# Patient Record
Sex: MALE | Race: ASIAN | NOT HISPANIC OR LATINO | Employment: FULL TIME | URBAN - METROPOLITAN AREA
[De-identification: names, ages, dates, MRNs, and addresses within clinical notes are randomized per-mention and may not be internally consistent; named-entity substitution may affect disease eponyms.]

---

## 2019-05-16 ENCOUNTER — OFFICE VISIT - HEALTHEAST (OUTPATIENT)
Dept: FAMILY MEDICINE | Facility: CLINIC | Age: 29
End: 2019-05-16

## 2019-05-16 DIAGNOSIS — L98.9 ECZEMATOUS SKIN LESIONS: ICD-10-CM

## 2019-05-16 DIAGNOSIS — B35.4 TINEA CORPORIS: ICD-10-CM

## 2019-05-16 DIAGNOSIS — B96.89 BACTERIAL SKIN INFECTION: ICD-10-CM

## 2019-05-16 DIAGNOSIS — L08.9 BACTERIAL SKIN INFECTION: ICD-10-CM

## 2019-05-16 RX ORDER — NAPROXEN SODIUM 220 MG
220 TABLET ORAL 2 TIMES DAILY WITH MEALS
Status: SHIPPED | COMMUNITY
Start: 2019-05-16

## 2019-05-16 RX ORDER — CLOTRIMAZOLE 1 %
CREAM (GRAM) TOPICAL
Qty: 30 G | Refills: 0 | Status: SHIPPED | COMMUNITY
Start: 2019-05-16

## 2019-05-19 ENCOUNTER — OFFICE VISIT - HEALTHEAST (OUTPATIENT)
Dept: FAMILY MEDICINE | Facility: CLINIC | Age: 29
End: 2019-05-19

## 2019-05-19 ENCOUNTER — COMMUNICATION - HEALTHEAST (OUTPATIENT)
Dept: FAMILY MEDICINE | Facility: CLINIC | Age: 29
End: 2019-05-19

## 2019-05-19 DIAGNOSIS — L30.1 DYSHIDROTIC ECZEMA: ICD-10-CM

## 2019-05-19 LAB
BACTERIA SPEC CULT: ABNORMAL
BACTERIA SPEC CULT: ABNORMAL

## 2019-05-24 ENCOUNTER — OFFICE VISIT - HEALTHEAST (OUTPATIENT)
Dept: FAMILY MEDICINE | Facility: CLINIC | Age: 29
End: 2019-05-24

## 2019-05-24 DIAGNOSIS — L30.1 DYSHIDROTIC ECZEMA: ICD-10-CM

## 2019-06-03 ENCOUNTER — OFFICE VISIT - HEALTHEAST (OUTPATIENT)
Dept: FAMILY MEDICINE | Facility: CLINIC | Age: 29
End: 2019-06-03

## 2019-06-03 DIAGNOSIS — Z23 NEED FOR TETANUS BOOSTER: ICD-10-CM

## 2019-06-03 DIAGNOSIS — L30.1 DYSHIDROTIC ECZEMA: ICD-10-CM

## 2019-06-03 DIAGNOSIS — Z00.00 ROUTINE MEDICAL EXAM: ICD-10-CM

## 2019-06-03 ASSESSMENT — MIFFLIN-ST. JEOR: SCORE: 1578.14

## 2019-06-10 ENCOUNTER — COMMUNICATION - HEALTHEAST (OUTPATIENT)
Dept: FAMILY MEDICINE | Facility: CLINIC | Age: 29
End: 2019-06-10

## 2019-06-17 ENCOUNTER — RECORDS - HEALTHEAST (OUTPATIENT)
Dept: ADMINISTRATIVE | Facility: OTHER | Age: 29
End: 2019-06-17

## 2019-06-18 ENCOUNTER — COMMUNICATION - HEALTHEAST (OUTPATIENT)
Dept: FAMILY MEDICINE | Facility: CLINIC | Age: 29
End: 2019-06-18

## 2019-06-18 ENCOUNTER — OFFICE VISIT - HEALTHEAST (OUTPATIENT)
Dept: FAMILY MEDICINE | Facility: CLINIC | Age: 29
End: 2019-06-18

## 2019-06-18 DIAGNOSIS — L30.1 DYSHIDROTIC ECZEMA: ICD-10-CM

## 2019-06-18 RX ORDER — MOMETASONE FUROATE 1 MG/G
OINTMENT TOPICAL
Status: SHIPPED | COMMUNITY
Start: 2019-06-17

## 2019-06-18 RX ORDER — FLUOCINONIDE 0.5 MG/G
OINTMENT TOPICAL
Status: SHIPPED | COMMUNITY
Start: 2019-06-17

## 2021-03-24 ENCOUNTER — AMBULATORY - HEALTHEAST (OUTPATIENT)
Dept: NURSING | Facility: CLINIC | Age: 31
End: 2021-03-24

## 2021-04-14 ENCOUNTER — AMBULATORY - HEALTHEAST (OUTPATIENT)
Dept: NURSING | Facility: CLINIC | Age: 31
End: 2021-04-14

## 2021-05-26 ENCOUNTER — RECORDS - HEALTHEAST (OUTPATIENT)
Dept: ADMINISTRATIVE | Facility: CLINIC | Age: 31
End: 2021-05-26

## 2021-05-28 NOTE — PROGRESS NOTES
Subjective:      Patient ID: Carl Pendleton is a 29 y.o. male.    Chief Complaint:    HPI  Carl Pendleton is a 29 y.o. male who presents today complaining of continued rash on the bilateral hands.  Patient was seen in urgent care on 5/16/2019 and diagnosed with eczema.  Patient was given triamcinolone 0.025%, and antifungal to use on the left thumb at the MCP joint and Keflex to help with superimposed infection.  Patient states that he still has had some discharge off the dorsum of the left foot that is described as clear fluid from the blisters.  He has not had constitutional symptoms include fever chills night sweats or fatigue.  He is ostensibly here stating that he has not had good resolution of the rash and was concerned that there was still fluid filled vesicles that were presenting.    Patient is on Keflex antibiotic day 3 of keflex.    No past medical history on file.    No past surgical history on file.    No family history on file.    Social History     Tobacco Use     Smoking status: Never Smoker     Smokeless tobacco: Never Used   Substance Use Topics     Alcohol use: Not on file     Drug use: Not on file       Review of Systems  As above in HPI, otherwise balance of Review of Systems are negative.    Objective:     /79   Pulse 81   Temp 97.7  F (36.5  C) (Oral)   Resp 16   Wt 159 lb (72.1 kg)   SpO2 98%   BMI 27.29 kg/m      Physical Exam  General: Patient is resting comfortably no acute distress is afebrile  HEENT: Head is normocephalic atraumatic   Skin: With fluid-filled vesicular rash and skin breakdown with erythema as consistent with the photos in epic on 5/16/2019.  I do think this is consistent with dyshidrotic eczema.      Assessment:     Procedures    The encounter diagnosis was Dyshidrotic eczema.    Plan:     1. Dyshidrotic eczema  triamcinolone (KENALOG) 0.1 % cream       Advised patient to stop the triamcinolone 0.025% and we will use the 0.1% which is much stronger.  Continue with  the antibiotic.  Keep the hands clean dry and protected.  Keep the appointment with family practice and will assume that this should be improving his symptoms.  Patient voiced understanding and he will return to clinic if he is not getting good resolution, otherwise, he will follow-up with his primary care provider as scheduled.    Patient Instructions   Use the stronger steroid.  Continue with the antibiotic as previously written  Keep the area clean dry and protected.  Follow-up with your primary care provider as previously scheduled.

## 2021-05-28 NOTE — PATIENT INSTRUCTIONS - HE
Use the stronger steroid.  Continue with the antibiotic as previously written  Keep the area clean dry and protected.  Follow-up with your primary care provider as previously scheduled.

## 2021-05-28 NOTE — TELEPHONE ENCOUNTER
Was seen again today, from an infectious standpoint provider who saw him today does feel this is improving, patient has follow up appointment with his PCP this week and if no improvement can add second antibiotic at that time if necessary.  Discussed this with patient who also notes improvement.

## 2021-05-29 NOTE — PROGRESS NOTES
1. Dyshidrotic eczema          Plan: He will continue the creams that were given to him at the dermatologist office.  I did fill out his short-term disability form for him today.  He does have a date that he is seeming to get back to work and we did include that on his forearms.  We made a copy of the forms for us and gave him the original so may also fax them over to his workplace to the number that the patient did provide for us.  If he is having any further problems, or if he is unable to work after he was supposed to go back, he will let us know.    Subjective: 29-year-old male is here today for a follow-up.  He has dyshidrotic eczema and has been having trouble with his hands and some other areas of his body from this.  It is a problem for him at work because he has to wear gloves and is exposed to other solvents and irritating chemicals.  He has short-term disability form for me to fill out today and we have a plan to keep him out of work for a period of time with some restrictions and then we will get him back to work full-time fairly quickly here.  He is getting slightly better from the medications that the dermatologist has been giving him.    Objective: Well-appearing male in no acute distress.  Vital signs as noted.  Chest clear to auscultation.  Heart regular rate and rhythm.  The skin does look a bit improved from before, but there certainly is still some eczema present on his hands and some onto his feet as well.

## 2021-05-29 NOTE — TELEPHONE ENCOUNTER
Name of form/paperwork: Other:  Workability form  Have you been seen for this request: Yes:  today  Do we have the form: patient stated he was told by his  that it would be faxed today.  When is form needed by: 3-5 business days  How would you like the form returned: fax  Fax Number: Fax to Maureen Martinez at [phone]  Patient Notified form requests are processed in 3-5 business days: Yes  (If patient needs form sooner, please note that in this message.)  Okay to leave a detailed message? Yes  [phone]

## 2021-05-29 NOTE — TELEPHONE ENCOUNTER
Who is calling:  Patient  Reason for Call:  Needs medical form to be filled out that he gave PCP 6/3 and went today today to .[phone] attention: Maureen Martinez, please call patient when this has been done  Date of last appointment with primary care: 6/3  Okay to leave a detailed message: Yes

## 2021-05-29 NOTE — PROGRESS NOTES
1. Dyshidrotic eczema  triamcinolone (KENALOG) 0.1 % cream    Ambulatory referral to Dermatology        This looks less like a staph infection to me and more like a dyshidrotic eczema which is gotten particularly bad.  We discussed possible contact that he might be exposed to since it really is mainly on the palms of both hands.  He is unsure what it might be but he will consider that as a possible etiology for this.  I did refill his triamcinolone cream that he can use on here twice a day as needed.  He may consider using some cotton gloves on it at night to keep it on the skin.  I also referred him to dermatology and hopefully they can get him in pretty quickly and see if there is anything else that they can do for him in regards to this.    Subjective: 29-year-old male here today for recheck of a rash in his hands.  He was seen recently in walk-in care and given some antibiotics thinking it may be a staph infection.  He reports that the advice did not do any good for her at all and in fact now it seems to be a bit worse than it was before.  It is not really spread to much of his hands although he notes a couple of spots that seem to be on his forearms.  No other areas on his body where it seems to be.  He is tried the triamcinolone cream that was given to him in the past and has worked moderately well to keep it under decent control.  He has been noticing more blisters on it however recently.  He does not know of any specific new contacts that may be contributing to this.  He is not really all that itchy and he notes of no hives up his arms at all.  No fevers or chills.  He is otherwise feeling well.    Objective: Well-appearing male in no acute distress.  Vital signs as noted.  Chest clear to auscultation without wheeze.  Heart regular rate and rhythm.  The skin his hands does show an eczema which is fairly significant.  He does have some blistering and peeling going on.  There is some spots though that looks  just more like traditional eczema on his hands as well.

## 2021-06-03 VITALS — WEIGHT: 158.1 LBS | HEIGHT: 64 IN | BODY MASS INDEX: 26.99 KG/M2

## 2021-06-03 VITALS — WEIGHT: 159.2 LBS | BODY MASS INDEX: 27.33 KG/M2

## 2021-06-03 VITALS — BODY MASS INDEX: 27.29 KG/M2 | WEIGHT: 159 LBS

## 2021-06-03 VITALS — WEIGHT: 161 LBS | BODY MASS INDEX: 27.64 KG/M2

## 2021-06-03 VITALS — BODY MASS INDEX: 24.55 KG/M2 | WEIGHT: 143 LBS

## 2021-06-16 PROBLEM — L30.1 DYSHIDROTIC ECZEMA: Status: ACTIVE | Noted: 2019-06-03

## 2021-06-17 NOTE — PATIENT INSTRUCTIONS - HE
Patient Instructions by Sonam Lopez MD at 5/16/2019  6:30 PM     Author: Sonam Lopez MD Service: -- Author Type: Physician    Filed: 5/16/2019  7:19 PM Encounter Date: 5/16/2019 Status: Addendum    : Sonam Lopez MD (Physician)    Related Notes: Original Note by Sonam Lopez MD (Physician) filed at 5/16/2019  7:19 PM       1. Keep appointment for follow up with primary care provider  2.  Also for itching may take oral generic Claritin, or Allegra or Zyrtec as needed  3. If symptoms are getting worse over time or you have any questions, call the clinic number - it's answered 24/7    Patient Education     Atopic Dermatitis (Adult)  Atopic dermatitis is a dry, itchy, red rash. Its also called eczema. The rash is chronic, or ongoing. It can come and go over time. The disease is often passed down in families. It causes a problem with the skin barrier that makes the skin more sensitive to the environment and other factors. The increased skin sensitivity causes an itch, which causes scratching. Scratching can worsen the itching or also break the skin. This can put the skin at risk of infection.  The condition is most common in people with asthma, hay fever, hives, or dry or sensitive skin. The rash may be caused by extreme heat or heavy sweating. Skin irritants can cause the rash to flare up. These can include wool or silk clothing, grease, oils, some medicines, and harsh soaps and detergents. Emotional stress can also be a trigger.  Treatment is done to relieve the itching and inflammation of the skin.  Home care  Follow these tips to care for your condition:    Keep the areas of rash clean by bathing at least every other day. Use lukewarm water to bathe. Dont use hot water, which can dry out the skin.    Dont use soaps with strong detergents. Use mild soaps made for sensitive skin.    Apply a cream or ointment to damp skin right after bathing.    Avoid things that irritate your skin. Wear  absorbent, soft fabrics next to the skin rather than rough or scratchy materials.    Use mild laundry soap free of scents and perfumes. Make sure to rinse all the soap out of your clothes.    Treat any skin infection as directed.    Use oral diphenhydramine to help reduce itching. This is an antihistamine you can buy at drug and grocery stores. It can make you sleepy, so use lower doses during the daytime. Or you can use loratadine. This is an antihistamine that will not make you sleepy. Do not use diphenhydramine if you have glaucoma or have trouble urinating due to an enlarged prostate.  Follow-up care  See your healthcare provider, or as advised. If your symptoms dont get better or if they get worse in the next 7 days, make an appointment with your healthcare provider.  When to seek medical advice  Call your healthcare provider right away  if any of these occur:    Increasing area of redness or pain in the skin    Yellow crusts or wet drainage from the rash    Fever of 100.4 F (38 C) or higher, or as directed by your healthcare provider  Date Last Reviewed: 9/1/2016 2000-2017 [employer]. [address] [place] [address]. All rights reserved. This information is not intended as a substitute for professional medical care. Always follow your healthcare professional's instructions.           Patient Education     Atopic Dermatitis (Adult)  Atopic dermatitis is a dry, itchy, red rash. Its also called eczema. The rash is chronic, or ongoing. It can come and go over time. The disease is often passed down in families. It causes a problem with the skin barrier that makes the skin more sensitive to the environment and other factors. The increased skin sensitivity causes an itch, which causes scratching. Scratching can worsen the itching or also break the skin. This can put the skin at risk of infection.  The condition is most common in people with asthma, hay fever, hives, or dry or sensitive  skin. The rash may be caused by extreme heat or heavy sweating. Skin irritants can cause the rash to flare up. These can include wool or silk clothing, grease, oils, some medicines, and harsh soaps and detergents. Emotional stress can also be a trigger.  Treatment is done to relieve the itching and inflammation of the skin.  Home care  Follow these tips to care for your condition:    Keep the areas of rash clean by bathing at least every other day. Use lukewarm water to bathe. Dont use hot water, which can dry out the skin.    Dont use soaps with strong detergents. Use mild soaps made for sensitive skin.    Apply a cream or ointment to damp skin right after bathing.    Avoid things that irritate your skin. Wear absorbent, soft fabrics next to the skin rather than rough or scratchy materials.    Use mild laundry soap free of scents and perfumes. Make sure to rinse all the soap out of your clothes.    Treat any skin infection as directed.    Use oral diphenhydramine to help reduce itching. This is an antihistamine you can buy at drug and grocery stores. It can make you sleepy, so use lower doses during the daytime. Or you can use loratadine. This is an antihistamine that will not make you sleepy. Do not use diphenhydramine if you have glaucoma or have trouble urinating due to an enlarged prostate.  Follow-up care  See your healthcare provider, or as advised. If your symptoms dont get better or if they get worse in the next 7 days, make an appointment with your healthcare provider.  When to seek medical advice  Call your healthcare provider right away  if any of these occur:    Increasing area of redness or pain in the skin    Yellow crusts or wet drainage from the rash    Fever of 100.4 F (38 C) or higher, or as directed by your healthcare provider  Date Last Reviewed: 9/1/2016 2000-2017 [employer]. [address] [place] [address], [place] [address]. All rights reserved. This information is not intended as a  substitute for professional medical care. Always follow your healthcare professional's instructions.           Patient Education     Fungal Skin Infection (Tinea)  A fungal infection occurs when too much fungus grows on or in the body. Fungus normally lives on the skin in small amounts and does not cause harm. But when too much grows on the skin, it causes an infection. This is also known as tinea. Fungal skin infections are common and not usually serious.  The infection often starts as a small red area the size of a pea. The skin may turn dry and flaky. The area may itch. As the fungus grows, it spreads out in a red [place]. Because of how it looks, fungal skin infection is often called ringworm, but it is not caused by a worm. Fungal skin infections can occur on many parts of the body. They can grow on the head, chest, arms, or legs. They can occur on the buttocks. On the feet, fungal infection is known as athletes foot. It causes itchy, sometimes painful sores between the toes and the bottom or sides of the feet. In the groin, the rash is called jock itch.  People with weak immune systems can get a fungal infection more easily. This includes people with diabetes or HIV, or who are being treated for cancer. In these cases, the fungal infection can spread and cause severe illness. Fungal infections are also more common in people who are overweight.  In most cases, treatment is done with antifungal cream or ointment. If the infection is on your scalp, you may take oral medicine. In some cases, a tiny piece of the skin (biopsy) may be taken. This is so it can be tested in a lab.  Common fungal infections are treated with creams on the skin or oral medicine.  Home care  Follow all instructions when using antifungal cream or ointment on your skin. Your healthcare provider may advise using cornstarch powder to keep your skin dry or petroleum jelly to provide a barrier.  General care:    If you were prescribed an oral  medicine, read the patient information. Talk with your healthcare provider about the risks and side effects.    Let your skin dry completely after bathing. Carefully dry your feet and between your toes.    Dress in loose cotton clothing.    Dont scratch the affected area. This can delay healing and may spread the infection. It can also cause a bacterial infection.    Keep your skin clean, but dont wash the skin too much. This can irritate your skin.    Keep in mind that it may take a week before the fungus starts to go away. It can take 2 to 4 weeks to fully clear. To prevent it from coming back, use the medicine until the rash is all gone.  Follow-up care  Follow up with your healthcare provider if the rash does not get better after 10 days of treatment. Also follow up if the rash spreads to other parts of your body.  When to seek medical advice  Call your healthcare provider right away if any of these occur:    Fever of 100.4 F (38 C) or higher    Redness or swelling that gets worse    Pain that gets worse    Foul-smelling fluid leaking from the skin  Date Last Reviewed: 11/1/2016 2000-2017 [employer]. [address] [place] [id number]. All rights reserved. This information is not intended as a substitute for professional medical care. Always follow your healthcare professional's instructions.

## 2021-06-27 NOTE — PROGRESS NOTES
Progress Notes by Sonam Lopez MD at 5/16/2019  6:30 PM     Author: Sonam Lopez MD Service: -- Author Type: Physician    Filed: 5/16/2019  7:39 PM Encounter Date: 5/16/2019 Status: Signed    : Sonam Lopez MD (Physician)           Subjective:   Carl Pendleton is a 29 y.o. male and is new to [facility].  Roomed by: LEELA Faye    Accompanied by Other Sister in Law / Carmelita   Refills needed? No    Do you have any forms that need to be filled out? No      Chief Complaint   Patient presents with   ? Eczema     getting  worse  x 2 weeks   Says the rash on his right hand started first. The rash got worse when he started scratching it. Then had itchy red rash on left hand and foot. Says he had sensitive skin when he was younger and would break out during the winter. Denies any family history of eczema, but brother as asthma, mother has seasonal allergies. In the past aloe vera would resolve his symptoms. Says the rash has gotten worse with scratching. Says it burns more than hurts. Has not had any recent fevers.   PMH - Ankylosing Spondylitis   PSH - none  FX - HTN - none, DM - none, CAD - none, Cancer - none  SX - Single, never smoker, he works at [employer] as a     Review of Systems  See HPI for ROS, otherwise balance of other systems negative  No Known Allergies    Current Outpatient Medications:   ?  naproxen sodium (ALEVE) 220 MG tablet, Take 220 mg by mouth 2 (two) times a day with meals., Disp: , Rfl:     Objective:     Vitals:    05/16/19 1834   BP: 118/71   Patient Site: Right Arm   Patient Position: Sitting   Cuff Size: Adult Regular   Pulse: 60   Resp: 17   Temp: 97.9  F (36.6  C)   TempSrc: Oral   SpO2: 98%   Weight: 161 lb (73 kg)     Gen - Pt in NAD  Cor - RRR w/o murmur  Lungs - Good air entry, no wheezes or crackles noted; no coughing noted  Skin -left proximal thumb notes an oval papular area approximately 1-1/2 x 2 cm with erythematous with slightly raised edges  On both hands  patient has -erythematous papules with excoriation and some induration  On the dorsum of his left foot and his first 3 toes -  areas of papular erythema, induration with slight skin desquamation  See Photos:                  Assessment - Plan   Medical Decision Making -29-year-old new patient to walk-in clinic presents with having an itchy rash on both hands and the top of his left foot for the last 2 weeks.  He does have to wash his hands frequently because he works as a [employer] .  He does have a positive family history for atopy.  On exam he does have both hands have papular erythema with induration and his left dorsum of his left foot has area on the toes that is erythematous, indurated and looks like a superinfection from his frequent scratching.  He does have a oval area on the proximal left thumb that looks more like tenia corporis.  Did a wound culture of the left foot.  We will treat this as a superinfection of eczematous dermatitis with cephalexin 3 times daily, over-the-counter Chlortrimazole for the thumb lesion and recommended moisturizers with triamcinolone ointment for the balance of his lesions.  He will have a follow-up visit with primary care on 5/24.  See patient instructions.    1. Bacterial skin infection  - cephalexin (KEFLEX) 500 MG capsule; Take 1 capsule (500 mg total) by mouth 3 (three) times a day for 7 days.  Dispense: 21 capsule; Refill: 0    2. Eczematous skin lesions  - Culture, Wound  - triamcinolone (KENALOG) 0.025 % ointment; Apply scant amount to areas of rash on hands and feet three times a day as needed for itching.  Dispense: 30 g; Refill: 0    3. Tinea corporis  - clotrimazole (LOTRIMIN) 1 % cream; Apply twice a day to affected area on left thumb x 4 weeks  Dispense: 30 g; Refill: 0    At the conclusion of the encounter, assessment and plan were discussed. All questions were answered. The patient or guardian acknowledged understanding and was involved in the decision  making regarding the overall care plan.    Patient Instructions   1. Keep appointment for follow up with primary care provider  2.  Also for itching may take oral generic Claritin, or Allegra or Zyrtec as needed  3. If symptoms are getting worse over time or you have any questions, call the clinic number - it's answered 24/7    Patient Education     Atopic Dermatitis (Adult)  Atopic dermatitis is a dry, itchy, red rash. Its also called eczema. The rash is chronic, or ongoing. It can come and go over time. The disease is often passed down in families. It causes a problem with the skin barrier that makes the skin more sensitive to the environment and other factors. The increased skin sensitivity causes an itch, which causes scratching. Scratching can worsen the itching or also break the skin. This can put the skin at risk of infection.  The condition is most common in people with asthma, hay fever, hives, or dry or sensitive skin. The rash may be caused by extreme heat or heavy sweating. Skin irritants can cause the rash to flare up. These can include wool or silk clothing, grease, oils, some medicines, and harsh soaps and detergents. Emotional stress can also be a trigger.  Treatment is done to relieve the itching and inflammation of the skin.  Home care  Follow these tips to care for your condition:    Keep the areas of rash clean by bathing at least every other day. Use lukewarm water to bathe. Dont use hot water, which can dry out the skin.    Dont use soaps with strong detergents. Use mild soaps made for sensitive skin.    Apply a cream or ointment to damp skin right after bathing.    Avoid things that irritate your skin. Wear absorbent, soft fabrics next to the skin rather than rough or scratchy materials.    Use mild laundry soap free of scents and perfumes. Make sure to rinse all the soap out of your clothes.    Treat any skin infection as directed.    Use oral diphenhydramine to help reduce itching. This is  an antihistamine you can buy at drug and grocery stores. It can make you sleepy, so use lower doses during the daytime. Or you can use loratadine. This is an antihistamine that will not make you sleepy. Do not use diphenhydramine if you have glaucoma or have trouble urinating due to an enlarged prostate.  Follow-up care  See your healthcare provider, or as advised. If your symptoms dont get better or if they get worse in the next 7 days, make an appointment with your healthcare provider.  When to seek medical advice  Call your healthcare provider right away  if any of these occur:    Increasing area of redness or pain in the skin    Yellow crusts or wet drainage from the rash    Fever of 100.4 F (38 C) or higher, or as directed by your healthcare provider  Date Last Reviewed: 9/1/2016 2000-2017 [employer] [address] [place] [address]. All rights reserved. This information is not intended as a substitute for professional medical care. Always follow your healthcare professional's instructions.           Patient Education     Atopic Dermatitis (Adult)  Atopic dermatitis is a dry, itchy, red rash. Its also called eczema. The rash is chronic, or ongoing. It can come and go over time. The disease is often passed down in families. It causes a problem with the skin barrier that makes the skin more sensitive to the environment and other factors. The increased skin sensitivity causes an itch, which causes scratching. Scratching can worsen the itching or also break the skin. This can put the skin at risk of infection.  The condition is most common in people with asthma, hay fever, hives, or dry or sensitive skin. The rash may be caused by extreme heat or heavy sweating. Skin irritants can cause the rash to flare up. These can include wool or silk clothing, grease, oils, some medicines, and harsh soaps and detergents. Emotional stress can also be a trigger.  Treatment is done to relieve the  itching and inflammation of the skin.  Home care  Follow these tips to care for your condition:    Keep the areas of rash clean by bathing at least every other day. Use lukewarm water to bathe. Dont use hot water, which can dry out the skin.    Dont use soaps with strong detergents. Use mild soaps made for sensitive skin.    Apply a cream or ointment to damp skin right after bathing.    Avoid things that irritate your skin. Wear absorbent, soft fabrics next to the skin rather than rough or scratchy materials.    Use mild laundry soap free of scents and perfumes. Make sure to rinse all the soap out of your clothes.    Treat any skin infection as directed.    Use oral diphenhydramine to help reduce itching. This is an antihistamine you can buy at drug and grocery stores. It can make you sleepy, so use lower doses during the daytime. Or you can use loratadine. This is an antihistamine that will not make you sleepy. Do not use diphenhydramine if you have glaucoma or have trouble urinating due to an enlarged prostate.  Follow-up care  See your healthcare provider, or as advised. If your symptoms dont get better or if they get worse in the next 7 days, make an appointment with your healthcare provider.  When to seek medical advice  Call your healthcare provider right away  if any of these occur:    Increasing area of redness or pain in the skin    Yellow crusts or wet drainage from the rash    Fever of 100.4 F (38 C) or higher, or as directed by your healthcare provider  Date Last Reviewed: 9/1/2016 2000-2017 [employer]. [address] [place] [address], [place] [address]. All rights reserved. This information is not intended as a substitute for professional medical care. Always follow your healthcare professional's instructions.           Patient Education     Fungal Skin Infection (Tinea)  A fungal infection occurs when too much fungus grows on or in the body. Fungus normally lives on the skin in small amounts  and does not cause harm. But when too much grows on the skin, it causes an infection. This is also known as tinea. Fungal skin infections are common and not usually serious.  The infection often starts as a small red area the size of a pea. The skin may turn dry and flaky. The area may itch. As the fungus grows, it spreads out in a red [place]. Because of how it looks, fungal skin infection is often called ringworm, but it is not caused by a worm. Fungal skin infections can occur on many parts of the body. They can grow on the head, chest, arms, or legs. They can occur on the buttocks. On the feet, fungal infection is known as athletes foot. It causes itchy, sometimes painful sores between the toes and the bottom or sides of the feet. In the groin, the rash is called jock itch.  People with weak immune systems can get a fungal infection more easily. This includes people with diabetes or HIV, or who are being treated for cancer. In these cases, the fungal infection can spread and cause severe illness. Fungal infections are also more common in people who are overweight.  In most cases, treatment is done with antifungal cream or ointment. If the infection is on your scalp, you may take oral medicine. In some cases, a tiny piece of the skin (biopsy) may be taken. This is so it can be tested in a lab.  Common fungal infections are treated with creams on the skin or oral medicine.  Home care  Follow all instructions when using antifungal cream or ointment on your skin. Your healthcare provider may advise using cornstarch powder to keep your skin dry or petroleum jelly to provide a barrier.  General care:    If you were prescribed an oral medicine, read the patient information. Talk with your healthcare provider about the risks and side effects.    Let your skin dry completely after bathing. Carefully dry your feet and between your toes.    Dress in loose cotton clothing.    Dont scratch the affected area. This can delay  healing and may spread the infection. It can also cause a bacterial infection.    Keep your skin clean, but dont wash the skin too much. This can irritate your skin.    Keep in mind that it may take a week before the fungus starts to go away. It can take 2 to 4 weeks to fully clear. To prevent it from coming back, use the medicine until the rash is all gone.  Follow-up care  Follow up with your healthcare provider if the rash does not get better after 10 days of treatment. Also follow up if the rash spreads to other parts of your body.  When to seek medical advice  Call your healthcare provider right away if any of these occur:    Fever of 100.4 F (38 C) or higher    Redness or swelling that gets worse    Pain that gets worse    Foul-smelling fluid leaking from the skin  Date Last Reviewed: 11/1/2016 2000-2017 [employer]. [address] [place] [address], [place] [address]. All rights reserved. This information is not intended as a substitute for professional medical care. Always follow your healthcare professional's instructions.

## 2021-06-27 NOTE — PROGRESS NOTES
Progress Notes by Segun Olvera MD at 6/3/2019  1:00 PM     Author: Segun Olvera MD Service: -- Author Type: Physician    Filed: 6/4/2019 11:51 AM Encounter Date: 6/3/2019 Status: Signed    : Segun Olvera MD (Physician)       MALE PREVENTATIVE EXAM    Assessment and Plan:       1. Routine medical exam    Generally the patient is doing very well.  We discussed healthy lifestyles, including adequate exercise (3-4 times a week for 20-30 minutes), and a healthy diet.  Patient should return for annual physicals, and we can also see them here as needed.       2. Dyshidrotic eczema    He has an upcoming appointment with dermatology, and we can see what the recommendations are from there.  He likely will need some work accommodations because he does work with some chemicals that might be irritating his hands so I did give him a note today, but it might need further clarification for dermatology after he sees them.    3. Need for tetanus booster    - Td, Preservative Free (green label)        Next follow up:  No follow-ups on file.    Immunization Review  Adult Imm Review: Due today, orders placed      I discussed the following with the patient:   Adult Healthy Living: Importance of regular exercise  Healthy nutrition  Getting adequate sleep  Stress management  Use of seat belts        Subjective:   Chief Complaint: Carl Pendleton is an 29 y.o. male here for a preventative health visit.     HPI: 29-year-old male here today for a physical.  We did an establish care brief visit with him last week.  He is doing generally pretty well.  He has the ongoing eczema issue for which she is seeing dermatology soon.  Otherwise he has no other major concerns or complaints today.    Healthy Habits  Are you taking a daily aspirin? No  Do you typically exercising at least 40 min, 3-4 times per week?  Yes  Do you usually eat at least 4 servings of fruit and vegetables a day, include whole grains and fiber and avoid regularly eating  "high fat foods? Yes  Have you had an eye exam in the past two years? NO  Do you see a dentist twice per year? NO  Do you have any concerns regarding sleep? No    Safety Screen  If you own firearms, are they secured in a locked gun cabinet or with trigger locks? The patient does not own any firearms  Do you feel you are safe where you are living?: Yes (5/19/2019  9:31 AM)  Do you feel you are safe in your relationship(s)?: Yes (5/19/2019  9:31 AM)      Review of Systems:  Please see above.  The rest of the review of systems are negative for all systems.     Cancer Screening     Patient has no health maintenance due at this time          Patient Care Team:  Segun Olvera MD as PCP - General (Family Medicine)        History     Reviewed By Date/Time Sections Reviewed    Segun Olvera MD 6/3/2019  1:03 PM Medical, Surgical, Tobacco, Alcohol, Drug Use, Sexual Activity, Family        Patient is establishing care with me today. Please see past medical history, surgical history, social history and family history, all of which were completed in their entirety today.      Objective:   Vital Signs:   Visit Vitals  /74 (Patient Site: Left Arm, Patient Position: Sitting, Cuff Size: Adult Regular)   Pulse 85   Ht 5' 4\" (1.626 m)   Wt 158 lb 1.6 oz (71.7 kg)   SpO2 96%   BMI 27.14 kg/m           PHYSICAL EXAM    Well developed, well nourished, no acute distress.  HEENT: normocephalic/atraumatic, PERRLA/EOMI, TMs: Gray, normal light reflex, no nasal discharge.  Oral mucosa: no erythema/exudate  Neck: No LAD/masses/thyromegaly/bruits  Lungs: clear bilaterally  Heart: regular rate and rhythm, no murmurs/gallops/rubs.  Abdomen: Normal bowel sounds, soft, non-tender, non-distended, no masses, neg Prater's/McBurney's, no rebound/guarding  Lymphatics: no supraclavicular/axillary/epitrochlear/inguinal LAD. No edema.  Neuro: A&O x 3, CN II-XII intact, strength 5/5, reflexes symmetric, sensory intact to light touch.  Psych: Behavior " appropriate, engaging.  Thought processes congruent, non-tangential.  Musculoskeletal: no gross deformities.  Skin: no rashes or lesions.            Medication List           Accurate as of 6/3/19 11:59 PM. If you have any questions, ask your nurse or doctor.               CONTINUE taking these medications    clotrimazole 1 % cream  Also known as:  LOTRIMIN  INSTRUCTIONS:  Apply twice a day to affected area on left thumb x 4 weeks        loratadine-pseudoephedrine  mg per 24 hr tablet  Also known as:  CLARITIN-D 24-hour  INSTRUCTIONS:  Take 1 tablet by mouth daily.        naproxen sodium 220 MG tablet  Also known as:  ALEVE  INSTRUCTIONS:  Take 220 mg by mouth 2 (two) times a day with meals.               Additional Screenings Completed Today:

## 2021-08-22 ENCOUNTER — HEALTH MAINTENANCE LETTER (OUTPATIENT)
Age: 31
End: 2021-08-22

## 2021-10-17 ENCOUNTER — HEALTH MAINTENANCE LETTER (OUTPATIENT)
Age: 31
End: 2021-10-17

## 2022-10-02 ENCOUNTER — HEALTH MAINTENANCE LETTER (OUTPATIENT)
Age: 32
End: 2022-10-02

## 2023-01-16 ENCOUNTER — IMMUNIZATION (OUTPATIENT)
Dept: NURSING | Facility: CLINIC | Age: 33
End: 2023-01-16

## 2023-01-16 PROCEDURE — 91312 COVID-19 VACCINE BIVALENT BOOSTER 12+ (PFIZER): CPT

## 2023-01-16 PROCEDURE — 0124A COVID-19 VACCINE BIVALENT BOOSTER 12+ (PFIZER): CPT

## 2023-06-14 ENCOUNTER — OFFICE VISIT (OUTPATIENT)
Dept: FAMILY MEDICINE | Facility: CLINIC | Age: 33
End: 2023-06-14
Payer: OTHER MISCELLANEOUS

## 2023-06-14 VITALS
TEMPERATURE: 98 F | SYSTOLIC BLOOD PRESSURE: 119 MMHG | HEART RATE: 74 BPM | RESPIRATION RATE: 16 BRPM | WEIGHT: 178.9 LBS | DIASTOLIC BLOOD PRESSURE: 84 MMHG | OXYGEN SATURATION: 98 % | BODY MASS INDEX: 30.71 KG/M2

## 2023-06-14 DIAGNOSIS — M62.838 TRAPEZIUS MUSCLE SPASM: Primary | ICD-10-CM

## 2023-06-14 DIAGNOSIS — S16.1XXA ACUTE CERVICAL MYOFASCIAL STRAIN, INITIAL ENCOUNTER: ICD-10-CM

## 2023-06-14 PROCEDURE — 99203 OFFICE O/P NEW LOW 30 MIN: CPT | Mod: 25 | Performed by: FAMILY MEDICINE

## 2023-06-14 PROCEDURE — 96372 THER/PROPH/DIAG INJ SC/IM: CPT | Performed by: FAMILY MEDICINE

## 2023-06-14 RX ORDER — KETOROLAC TROMETHAMINE 30 MG/ML
30 INJECTION, SOLUTION INTRAMUSCULAR; INTRAVENOUS ONCE
Status: COMPLETED | OUTPATIENT
Start: 2023-06-14 | End: 2023-06-14

## 2023-06-14 RX ORDER — CYCLOBENZAPRINE HCL 5 MG
5 TABLET ORAL 3 TIMES DAILY PRN
Qty: 30 TABLET | Refills: 0 | Status: SHIPPED | OUTPATIENT
Start: 2023-06-14

## 2023-06-14 RX ORDER — OMEGA-3 FATTY ACIDS/FISH OIL 300-1000MG
200 CAPSULE ORAL EVERY 4 HOURS PRN
COMMUNITY

## 2023-06-14 RX ADMIN — KETOROLAC TROMETHAMINE 30 MG: 30 INJECTION, SOLUTION INTRAMUSCULAR; INTRAVENOUS at 14:17

## 2023-06-14 NOTE — PATIENT INSTRUCTIONS
Toradol injection given in the office today - this is in the ibuprofen category and will ease pain and inflammation. Lasts about 12 hours. Do not take any further ibuprofen until tomorrow.     May continue ibuprofen (tomorrow) as able for inflammation - take with food. May also take tylenol every 6 hours - with the ibuprofen or staggered - to help ease pain.      Cyclobenzaprine 5mg at bedtime and every 8 hours as needed for muscle spasm. This will ease tension in the muscles. Can make you drowsy and fuzzy - do not use at work or while driving.     Alternate ice and heat packs on the upper back or neck. Use for 20 minutes multiple times a day while not at work.     Follow up as needed/planned with chiropractor.     I agree with rest and no work for the next few days. Avoid computer or looking down at phone. Avoid too much slouching in chairs or sofa.   Gentle movements and stretching as able.     Recheck as needed.

## 2023-06-15 NOTE — PROGRESS NOTES
Assessment/Plan:   Trapezius muscle spasm  Acute cervical myofascial strain, initial encounter  Acute cervical/trazpeius strain at work on 5/30/23. Increased trapezius muscle spasm limiting ongoing chiropractor treatments.   Toradol given in office today. Will add flexeril and more regular dosing of antiinflammatories, ibuprofen or aleve.   Instructions below.    - cyclobenzaprine (FLEXERIL) 5 MG tablet; Take 1 tablet (5 mg) by mouth 3 times daily as needed for muscle spasms  Dispense: 30 tablet; Refill: 0  - ketorolac (TORADOL) injection 30 mg    Toradol injection given in the office today - this is in the ibuprofen category and will ease pain and inflammation. Lasts about 12 hours. Do not take any further ibuprofen until tomorrow.     May continue ibuprofen (tomorrow) as able for inflammation - take with food. May also take tylenol every 6 hours - with the ibuprofen or staggered - to help ease pain.      Cyclobenzaprine 5mg at bedtime and every 8 hours as needed for muscle spasm. This will ease tension in the muscles. Can make you drowsy and fuzzy - do not use at work or while driving.     Alternate ice and heat packs on the upper back or neck. Use for 20 minutes multiple times a day while not at work.     Follow up as needed/planned with chiropractor.     I agree with rest and no work for the next few days. Avoid computer or looking down at phone. Avoid too much slouching in chairs or sofa.   Gentle movements and stretching as able.     Recheck as needed.     I discussed red flag symptoms, return precautions to clinic/ER and follow up care with patient/parent.  Expected clinical course, symptomatic cares advised. Questions answered. Patient/parent amenable with plan.      Subjective:     Carl Pendleton is a 33 year old male who presents for evaluation of neck and upper back pain.   Onset of injury was at work on 5/30/23. He had been lifting a box of fabric when he felt a twinge and pain near the right shoulder  blade. He has had ongoing neck, back and shoulder pain since that time.   He has been treated by a chiropractor since the injury.   The pain has been worse causing disruption of sleep for a few days.   The chiropractor today said there was too much muscle spasm for him to doing any treatment today and recommended he be seen here.   No pain in the arm, no weakness or paresthesias.     No Known Allergies  Current Outpatient Medications   Medication     cyclobenzaprine (FLEXERIL) 5 MG tablet     ibuprofen (ADVIL/MOTRIN) 200 MG capsule     clotrimazole (LOTRIMIN) 1 % cream     fluocinonide (LIDEX) 0.05 % ointment     loratadine-pseudoephedrine (CLARITIN-D 24-HOUR)  mg per 24 hr tablet     mometasone (ELOCON) 0.1 % ointment     naproxen sodium (ALEVE) 220 MG tablet     No current facility-administered medications for this visit.     Patient Active Problem List   Diagnosis     Dyshidrotic eczema       Objective:     /84   Pulse 74   Temp 98  F (36.7  C) (Oral)   Resp 16   Wt 81.1 kg (178 lb 14.4 oz)   SpO2 98%   BMI 30.71 kg/m      Physical    General Appearance: Alert, pleasant, no distress but uncomfortable and moves head gingerly, AVSS  Head: Normocephalic, without obvious abnormality, atraumatic  Eyes: Conjunctivae are normal.   Neck: decreased range of motion due to pain and stiffness right side. The right trapezius muscle has spasm and is tender throughout.   Lungs:  respirations unlabored  Back: no pain with percussion of the spine. No lower back pain.   Extremities: Normal strength BUE. Normal sensation and range of motion.   Skin:  no rashes or lesions  Psychiatric: Patient has a normal mood and affect.     This note has been dictated in part using voice recognition software.  Any grammatical or context distortions are unintentional and inherent to the software.  Please feel free to contact me directly for clarification if needed.

## 2023-10-21 ENCOUNTER — HEALTH MAINTENANCE LETTER (OUTPATIENT)
Age: 33
End: 2023-10-21

## 2024-12-14 ENCOUNTER — HEALTH MAINTENANCE LETTER (OUTPATIENT)
Age: 34
End: 2024-12-14